# Patient Record
Sex: MALE | NOT HISPANIC OR LATINO | URBAN - NONMETROPOLITAN AREA
[De-identification: names, ages, dates, MRNs, and addresses within clinical notes are randomized per-mention and may not be internally consistent; named-entity substitution may affect disease eponyms.]

---

## 2021-07-22 ENCOUNTER — OFFICE VISIT (OUTPATIENT)
Dept: URBAN - NONMETROPOLITAN AREA CLINIC 2 | Facility: CLINIC | Age: 36
End: 2021-07-22

## 2022-03-15 ENCOUNTER — DASHBOARD ENCOUNTERS (OUTPATIENT)
Age: 37
End: 2022-03-15

## 2022-03-24 ENCOUNTER — OFFICE VISIT (OUTPATIENT)
Dept: URBAN - NONMETROPOLITAN AREA CLINIC 2 | Facility: CLINIC | Age: 37
End: 2022-03-24
Payer: COMMERCIAL

## 2022-03-24 VITALS
DIASTOLIC BLOOD PRESSURE: 90 MMHG | BODY MASS INDEX: 34.36 KG/M2 | WEIGHT: 232 LBS | HEART RATE: 97 BPM | HEIGHT: 69 IN | SYSTOLIC BLOOD PRESSURE: 145 MMHG | TEMPERATURE: 97 F

## 2022-03-24 DIAGNOSIS — K52.9 ILEITIS: ICD-10-CM

## 2022-03-24 DIAGNOSIS — K51.90 ULCERATIVE COLITIS WITHOUT COMPLICATIONS, UNSPECIFIED LOCATION: ICD-10-CM

## 2022-03-24 DIAGNOSIS — D72.829 LEUKOCYTOSIS, UNSPECIFIED TYPE: ICD-10-CM

## 2022-03-24 PROBLEM — 111583006: Status: ACTIVE | Noted: 2022-03-24

## 2022-03-24 PROBLEM — 64766004: Status: ACTIVE | Noted: 2022-03-24

## 2022-03-24 PROCEDURE — 99214 OFFICE O/P EST MOD 30 MIN: CPT | Performed by: NURSE PRACTITIONER

## 2022-03-24 RX ORDER — PREDNISONE 10 MG/1
4 PO QD X 5 DAYS, 3 PO QD X 5 DAYS, 2 PO QD X 5 DAYS, 1 PO QD X 5 DAYS, THEN 1/2 QD X 5 DAYS, THEN STOP TABLET ORAL
Qty: 53 | Refills: 0 | Status: ACTIVE | COMMUNITY
Start: 2020-01-14

## 2022-03-24 RX ORDER — SODIUM PICOSULFATE, MAGNESIUM OXIDE, AND ANHYDROUS CITRIC ACID 10; 3.5; 12 MG/160ML; G/160ML; G/160ML
160ML LIQUID ORAL
Qty: 1 BOX | Refills: 0 | OUTPATIENT
Start: 2022-03-24 | End: 2022-03-25

## 2022-03-24 RX ORDER — METRONIDAZOLE 500 MG/1
TABLET ORAL
Qty: 0 | Refills: 0 | Status: ACTIVE | COMMUNITY
Start: 1900-01-01

## 2022-03-24 NOTE — HPI-TODAY'S VISIT:
Xavi is a 35 yo M who returns for ileitis and appendagitis. In 2016, he had a colonoscopy with dr person with ileitis. Later, he saw Dr Shelley and had a repeat 2018 colonoscopy with ileitis, but no granulomas. He had an positive IBD prognositc that was consistent with UC so he was started on apriso 2 pills daily. In 2020, he had appendagitis.  All of his GI symptoms resolved and he has been off apriso for about a year or two, but over the last few months, he has had some usual fatigue. He had a workup and kept having leukocyctosis and referred to Dr Valles for management. She thinks it may be r/t possible UC vs crohns and he has been referred back for repeat colonoscopy to evaluate this. Overall, he is feeling well today  SB

## 2022-03-30 ENCOUNTER — OFFICE VISIT (OUTPATIENT)
Dept: URBAN - NONMETROPOLITAN AREA SURGERY CENTER 1 | Facility: SURGERY CENTER | Age: 37
End: 2022-03-30
Payer: COMMERCIAL

## 2022-03-30 DIAGNOSIS — K63.89 BACTERIAL OVERGROWTH SYNDROME: ICD-10-CM

## 2022-03-30 PROCEDURE — G8907 PT DOC NO EVENTS ON DISCHARG: HCPCS | Performed by: INTERNAL MEDICINE

## 2022-03-30 PROCEDURE — 45378 DIAGNOSTIC COLONOSCOPY: CPT | Performed by: INTERNAL MEDICINE

## 2022-03-30 RX ORDER — METRONIDAZOLE 500 MG/1
TABLET ORAL
Qty: 0 | Refills: 0 | Status: ACTIVE | COMMUNITY
Start: 1900-01-01

## 2022-03-30 RX ORDER — PREDNISONE 10 MG/1
4 PO QD X 5 DAYS, 3 PO QD X 5 DAYS, 2 PO QD X 5 DAYS, 1 PO QD X 5 DAYS, THEN 1/2 QD X 5 DAYS, THEN STOP TABLET ORAL
Qty: 53 | Refills: 0 | Status: ACTIVE | COMMUNITY
Start: 2020-01-14

## 2022-05-12 ENCOUNTER — OFFICE VISIT (OUTPATIENT)
Dept: URBAN - NONMETROPOLITAN AREA CLINIC 2 | Facility: CLINIC | Age: 37
End: 2022-05-12